# Patient Record
Sex: FEMALE | Race: WHITE
[De-identification: names, ages, dates, MRNs, and addresses within clinical notes are randomized per-mention and may not be internally consistent; named-entity substitution may affect disease eponyms.]

---

## 2018-01-23 ENCOUNTER — HOSPITAL ENCOUNTER (EMERGENCY)
Dept: HOSPITAL 96 - M.ERS | Age: 68
Discharge: HOME | End: 2018-01-23
Payer: COMMERCIAL

## 2018-01-23 VITALS — WEIGHT: 130.01 LBS | HEIGHT: 63 IN | BODY MASS INDEX: 23.04 KG/M2

## 2018-01-23 VITALS — DIASTOLIC BLOOD PRESSURE: 62 MMHG | SYSTOLIC BLOOD PRESSURE: 100 MMHG

## 2018-01-23 DIAGNOSIS — Z88.8: ICD-10-CM

## 2018-01-23 DIAGNOSIS — B34.9: ICD-10-CM

## 2018-01-23 DIAGNOSIS — G43.909: ICD-10-CM

## 2018-01-23 DIAGNOSIS — J38.5: Primary | ICD-10-CM

## 2018-01-23 DIAGNOSIS — Z88.5: ICD-10-CM

## 2018-01-23 LAB
ABSOLUTE BASOPHILS: 0.1 THOU/UL (ref 0–0.2)
ABSOLUTE EOSINOPHILS: 0 THOU/UL (ref 0–0.7)
ABSOLUTE MONOCYTES: 0.6 THOU/UL (ref 0–1.2)
ALBUMIN SERPL-MCNC: 3.6 G/DL (ref 3.4–5)
ALP SERPL-CCNC: 68 U/L (ref 46–116)
ALT SERPL-CCNC: 22 U/L (ref 30–65)
ANION GAP SERPL CALC-SCNC: 10 MMOL/L (ref 7–16)
AST SERPL-CCNC: 15 U/L (ref 15–37)
BASOPHILS NFR BLD AUTO: 0.7 %
BILIRUB SERPL-MCNC: 0.4 MG/DL
BUN SERPL-MCNC: 22 MG/DL (ref 7–18)
CALCIUM SERPL-MCNC: 9.3 MG/DL (ref 8.5–10.1)
CHLORIDE SERPL-SCNC: 108 MMOL/L (ref 98–107)
CO2 SERPL-SCNC: 25 MMOL/L (ref 21–32)
CREAT SERPL-MCNC: 0.9 MG/DL (ref 0.6–1.3)
EOSINOPHIL NFR BLD: 0.2 %
GLUCOSE SERPL-MCNC: 88 MG/DL (ref 70–99)
GRANULOCYTES NFR BLD MANUAL: 69 %
HCT VFR BLD CALC: 43 % (ref 37–47)
HGB BLD-MCNC: 15.2 GM/DL (ref 12–15)
LYMPHOCYTES # BLD: 1.7 THOU/UL (ref 0.8–5.3)
LYMPHOCYTES NFR BLD AUTO: 22.6 %
MCH RBC QN AUTO: 31.4 PG (ref 26–34)
MCHC RBC AUTO-ENTMCNC: 35.5 G/DL (ref 28–37)
MCV RBC: 88.5 FL (ref 80–100)
MONOCYTES NFR BLD: 7.5 %
MPV: 10.3 FL. (ref 7.2–11.1)
NEUTROPHILS # BLD: 5.2 THOU/UL (ref 1.6–8.1)
NUCLEATED RBCS: 0 /100WBC
PLATELET COUNT*: 218 THOU/UL (ref 150–400)
POTASSIUM SERPL-SCNC: 4.2 MMOL/L (ref 3.5–5.1)
PROT SERPL-MCNC: 6.7 G/DL (ref 6.4–8.2)
RBC # BLD AUTO: 4.85 MIL/UL (ref 4.2–5)
RDW-CV: 14.2 % (ref 10.5–14.5)
SODIUM SERPL-SCNC: 143 MMOL/L (ref 136–145)
TROPONIN-I LEVEL: <0.06 NG/ML (ref ?–0.06)
WBC # BLD AUTO: 7.5 THOU/UL (ref 4–11)

## 2018-01-24 NOTE — [place] [address]
Phone:  [phone]                     ELECTROCARDIOGRAM REPORT      
_______________________________________________________________________________
 
Name:       RORY CHU               [facility]                      [facility]  [id number]      Account #:      [id number]  
Admission:  18     Attend Phys:                         
Discharge:  18     Date of Birth:  [birth year]  
         Report #: [id number]
    [id number]
_______________________________________________________________________________
THIS REPORT FOR:  //name//                      
 
                         [facility] ED
                                       
Test Date:    2018               Test Time:    14:44:45
Pat Name:     RORY TANMOJGAN           Department:   
Patient ID:   [id number]            Room:          
Gender:       F                        Technician:   KUN BARCLAY
:          1950               Requested By: Barbie Davalos
Order Number: [id number] MD:   Ricci Zacarias
                                 Measurements
Intervals                              Axis          
Rate:         62                       P:            35
HI:           168                      QRS:          41
QRSD:         103                      T:            29
QT:           422                                    
QTc:          429                                    
                           Interpretive Statements
Sinus rhythm
No previous ECG available for comparison
 
Electronically Signed On 2018 11:40:49 CST by Ricci Zacarias
[email or web address]
 
 
 
 
 
 
 
 
 
 
 
 
 
 
 
 
 
 
 
 
  <ELECTRONICALLY SIGNED>
                                           By: Ricci Zacarias MD, [facility]      
  18     1140
D: 18 1444   _____________________________________
T: 18 1444   Ricci Zacarias MD, FACC        /EPI